# Patient Record
Sex: MALE | Race: WHITE | NOT HISPANIC OR LATINO | Employment: FULL TIME | URBAN - METROPOLITAN AREA
[De-identification: names, ages, dates, MRNs, and addresses within clinical notes are randomized per-mention and may not be internally consistent; named-entity substitution may affect disease eponyms.]

---

## 2020-03-10 NOTE — PROGRESS NOTES
[facility]   [address] [place] [address], [address]  [place], [place], [id number] [phone]  WWW.[facility]           OUTPATIENT CLINIC CONSULTATION NOTE    Patient care team:  Patient Care Team:  Brad Fenton DO as PCP - General (Family Medicine)    Requesting Provider and Reason for consultation: The patient is being seen today at the request of Brad Fenton DO for diastolic dysfunction.     Subjective:   Chief complaint:   Chief Complaint   Patient presents with   • diastolic disfunction       HPI:    En Saeed is a 65 y.o. male.  Daughter works as a pharmacist here at [employer]    Cardiac problem list:  1. Heart murmur, aortic sclerosis, mild aortic regurgitation  2. Hypertension  3. Hyperlipidemia  4. Hypothyroidism  5. GERD    He presents today for consultation.    The patient states that he has had mild nonradiating, upper epigastric discomfort while on a treadmill but does not feel this all the time.  When he walks 30 minutes a day or goes on hikes sometimes he feels just fine.  He also has reflux which occurs at different times than the epigastric discomfort described above.  Has tried various antacids and is currently on Protonix.  Still sometimes feels discomfort after heavy meals or when laying down.  This particular discomfort is not experienced when he exercises.  Denies associated dyspnea, palpitations.  Has dependent ankle edema at the end of the day sometimes that improves at night.  Non-smoker, no family history of premature coronary artery disease      Review of Systems:  Positive for epigastric discomfort, dependent edema  Negative for dyspnea with exertion, orthopnea, PND, palpitations, lightheadedness, syncope.   All other systems reviewed and are negative.    PFSH:  Patient Active Problem List   Diagnosis   • Essential hypertension   • Mixed hyperlipidemia   • GERD (gastroesophageal reflux disease)   • Hypothyroidism  (acquired)   • Aortic valve sclerosis   • Non-rheumatic aortic regurgitation         Current Outpatient Medications:   •  amLODIPine (NORVASC) 10 MG tablet, Take 10 mg by mouth Daily., Disp: , Rfl:   •  aspirin 81 MG EC tablet, Take 81 mg by mouth Daily., Disp: , Rfl:   •  azelastine (OPTIVAR) 0.05 % ophthalmic solution, 1 drop 2 (Two) Times a Day., Disp: , Rfl:   •  brimonidine-timolol (COMBIGAN) 0.2-0.5 % ophthalmic solution, Every 12 (Twelve) Hours., Disp: , Rfl:   •  coenzyme Q10 100 MG capsule, Take 100 mg by mouth Daily., Disp: , Rfl:   •  Krill Oil 1000 MG capsule, Take  by mouth., Disp: , Rfl:   •  levothyroxine (SYNTHROID, LEVOTHROID) 125 MCG tablet, Take 125 mcg by mouth Daily., Disp: , Rfl:   •  lisinopril (PRINIVIL,ZESTRIL) 40 MG tablet, Take 40 mg by mouth Daily., Disp: , Rfl:   •  loteprednol etabonate (LOTEMAX) 0.5 % gel ophthalmic gel, Apply  to eye(s) as directed by provider 4 (Four) Times a Day., Disp: , Rfl:   •  Multiple Vitamin (MULTI-VITAMIN DAILY PO), Take  by mouth Daily., Disp: , Rfl:   •  pantoprazole (PROTONIX) 40 MG EC tablet, Take 40 mg by mouth Daily., Disp: , Rfl:   •  rosuvastatin (CRESTOR) 10 MG tablet, Take 10 mg by mouth Daily., Disp: , Rfl:     Allergies   Allergen Reactions   • Ciprofloxacin Anaphylaxis   • Sulfa Antibiotics GI Intolerance       Social History     Socioeconomic History   • Marital status:      Spouse name: Not on file   • Number of children: Not on file   • Years of education: Not on file   • Highest education level: Not on file   Tobacco Use   • Smoking status: Former Smoker     Types: Cigarettes     Last attempt to quit: 3/13/1980     Years since quittin.0   • Smokeless tobacco: Never Used   Substance and Sexual Activity   • Alcohol use: Yes     Comment: occas   • Drug use: Never   • Sexual activity: Defer     Family History   Problem Relation Age of Onset   • Stroke Other    • Hypertension Other    • Hyperlipidemia Other    • Hypertension Mother     "  • Stroke Father    • Leukemia Sister    • No Known Problems Brother    • No Known Problems Brother          Objective:   Physical Exam:  /76 (BP Location: Right arm, Patient Position: Sitting)   Pulse 68   Ht 180.3 cm (71\")   Wt 92.1 kg (203 lb)   SpO2 98%   BMI 28.31 kg/m²   CONSTITUTIONAL: Well-nourished. In no acute distress.   SKIN: Warm and dry. No rashes noted  HEENT: Head is normocephalic and atraumatic. Pupils are equal and reactive to light bilaterally. Mucous membranes are pink and moist.   NECK: Supple without masses or thyromegaly. There is no jugular venous distention at 30°.  LUNGS: Normal effort. Clear to auscultation bilaterally without wheezing, rhonchi, or rales noted.   CARDIOVASCULAR: Regular rate with a normal S1 and S2.  Soft 2 out of 6 systolic ejection murmur at the right upper sternal border with no radiation.  Preserved S2.  There is no  gallop, rub, or click appreciated. Carotid upstrokes are 2+ and symmetrical without bruits. There is no peripheral edema.   ABDOMEN: Normal bowel sounds.  Soft and nondistended. No tenderness with palpitation.   MUSCULOSKELETAL:  No digital cyanosis  NEUROLOGICAL: Nonfocal.  PSYCHIATRIC: Alert, orientated x 3, appropriate affect         Labs:  No results found for: BUN, CREATININE, K, ALT, AST  No results found for: WBC, HGB, HCT, PLT    No results found for: CHOL  No results found for: TRIG  No results found for: HDL  No results found for: LDL  No components found for: LDLDIRECTC    Outside labs 1/21/2020, checked before increasing statin therapy to Crestor 10 mg nightly  Total cholesterol 252  HDL 50    Triglycerides 179    Diagnostic Data:      ECG 12 Lead  Date/Time: 3/13/2020 10:29 AM  Performed by: Garfield Johnson MD  Authorized by: Garfield Johnson MD   Comparison: not compared with previous ECG   Rhythm: sinus rhythm  Other findings: left ventricular hypertrophy  Comments: Heart rate 68, QRS 92, QTc 418            Left heart " catheterization 9/2000: Reportedly normal coronary anatomy    Transthoracic echocardiogram [facility] 1/30/2020  -Normal left ventricular wall thickness.  -Visually estimated EF 55% ±5%.  -Normal left ventricular systolic function with normal systolic strain pattern.  -Indeterminate diastolic dysfunction.  -Left atrial dimension 3.85cm, left atrial volume index of 37 ml/m2  -Sclerotic aortic valve leaflets, Mild aortic regurgitation  -Mild tricuspid regurgitation  -Mild pulmonic regurgitation    Assessment and Plan:   En was seen today for diastolic disfunction.    Diagnoses and all orders for this visit:    Anginal equivalent (CMS/HCC)  Precordial chest pain  Essential hypertension  Mixed hyperlipidemia  -The patient's discomforts are atypical for angina but he does sometimes note it more prominently while on the treadmill.  Has risk factors for CAD including hypertension, dyslipidemia, age  -Recommend exercise stress echocardiogram to rule out evidence of ischemia  -Discussed the importance of lifelong lifestyle and risk factor modification including maintaining a controlled blood pressure and continue to work on cholesterol reduction.  I agree with the recent increase in his rosuvastatin to 10 mg nightly.  May warrant even a higher dose in the future  -Continue aspirin for now    Aortic valve sclerosis  Non-rheumatic aortic regurgitation  -The patient has a murmur in the right upper sternal border position consistent with his aortic valve sclerosis  -Has mild aortic regurgitation likely related to the aortic valve changes  -Recommend lifestyle risk factor modifications as noted above in effort to prevent further progression.  Findings are very mild at the current time    Question of whether or not the patient has diastolic dysfunction  -The patient's echocardiogram does not show evidence of diastolic dysfunction  -We will obtain a copy of the patient's echocardiogram CD from [facility] to review the diastolic  parameters further and call the patient with any additional pertinent findings    - Return in about 1 year (around 3/13/2021).     Garfield Johnson MD, MSc, [facility]  Interventional Cardiology  [facility] at [facility]

## 2020-03-13 ENCOUNTER — CONSULT (OUTPATIENT)
Dept: CARDIOLOGY | Facility: CLINIC | Age: 65
End: 2020-03-13

## 2020-03-13 ENCOUNTER — TELEPHONE (OUTPATIENT)
Dept: CARDIOLOGY | Facility: CLINIC | Age: 65
End: 2020-03-13

## 2020-03-13 VITALS
HEART RATE: 68 BPM | OXYGEN SATURATION: 98 % | DIASTOLIC BLOOD PRESSURE: 76 MMHG | BODY MASS INDEX: 28.42 KG/M2 | SYSTOLIC BLOOD PRESSURE: 120 MMHG | HEIGHT: 71 IN | WEIGHT: 203 LBS

## 2020-03-13 DIAGNOSIS — E78.2 MIXED HYPERLIPIDEMIA: ICD-10-CM

## 2020-03-13 DIAGNOSIS — I20.8 ANGINAL EQUIVALENT (HCC): Primary | ICD-10-CM

## 2020-03-13 DIAGNOSIS — R07.2 PRECORDIAL CHEST PAIN: ICD-10-CM

## 2020-03-13 DIAGNOSIS — I35.1 NON-RHEUMATIC AORTIC REGURGITATION: ICD-10-CM

## 2020-03-13 DIAGNOSIS — I35.8 AORTIC VALVE SCLEROSIS: ICD-10-CM

## 2020-03-13 DIAGNOSIS — I10 ESSENTIAL HYPERTENSION: ICD-10-CM

## 2020-03-13 PROBLEM — K21.9 GERD (GASTROESOPHAGEAL REFLUX DISEASE): Status: ACTIVE | Noted: 2020-03-13

## 2020-03-13 PROBLEM — E03.9 HYPOTHYROIDISM (ACQUIRED): Status: ACTIVE | Noted: 2020-03-13

## 2020-03-13 PROCEDURE — 93000 ELECTROCARDIOGRAM COMPLETE: CPT | Performed by: INTERNAL MEDICINE

## 2020-03-13 PROCEDURE — 99204 OFFICE O/P NEW MOD 45 MIN: CPT | Performed by: INTERNAL MEDICINE

## 2020-03-13 RX ORDER — LEVOTHYROXINE SODIUM 0.12 MG/1
125 TABLET ORAL DAILY
COMMUNITY

## 2020-03-13 RX ORDER — LOTEPREDNOL ETABONATE 5 MG/G
GEL OPHTHALMIC 4 TIMES DAILY
COMMUNITY

## 2020-03-13 RX ORDER — AMLODIPINE BESYLATE 10 MG/1
10 TABLET ORAL DAILY
COMMUNITY

## 2020-03-13 RX ORDER — BRIMONIDINE TARTRATE AND TIMOLOL MALEATE 2; 5 MG/ML; MG/ML
SOLUTION OPHTHALMIC EVERY 12 HOURS
COMMUNITY

## 2020-03-13 RX ORDER — UBIDECARENONE 100 MG
100 CAPSULE ORAL DAILY
COMMUNITY

## 2020-03-13 RX ORDER — AZELASTINE HYDROCHLORIDE 0.5 MG/ML
1 SOLUTION/ DROPS OPHTHALMIC 2 TIMES DAILY
COMMUNITY

## 2020-03-13 RX ORDER — LISINOPRIL 40 MG/1
40 TABLET ORAL DAILY
COMMUNITY

## 2020-03-13 RX ORDER — BIOTIN 5 MG
TABLET ORAL
COMMUNITY

## 2020-03-13 RX ORDER — ROSUVASTATIN CALCIUM 10 MG/1
10 TABLET, COATED ORAL DAILY
COMMUNITY

## 2020-03-13 RX ORDER — PANTOPRAZOLE SODIUM 40 MG/1
40 TABLET, DELAYED RELEASE ORAL DAILY
COMMUNITY

## 2020-03-13 RX ORDER — ASPIRIN 81 MG/1
81 TABLET ORAL DAILY
COMMUNITY

## 2020-03-27 ENCOUNTER — HOSPITAL ENCOUNTER (OUTPATIENT)
Dept: CARDIOLOGY | Facility: HOSPITAL | Age: 65
Discharge: HOME | End: 2020-03-27

## 2020-03-27 DIAGNOSIS — Z00.6 EXAMINATION FOR NORMAL COMPARISON OR CONTROL IN CLINICAL RESEARCH: ICD-10-CM

## 2020-04-02 ENCOUNTER — APPOINTMENT (OUTPATIENT)
Dept: CARDIOLOGY | Facility: HOSPITAL | Age: 65
End: 2020-04-02

## 2020-05-08 ENCOUNTER — HOSPITAL ENCOUNTER (OUTPATIENT)
Dept: CARDIOLOGY | Facility: HOSPITAL | Age: 65
Discharge: HOME | End: 2020-05-08
Admitting: INTERNAL MEDICINE

## 2020-05-08 VITALS
HEART RATE: 82 BPM | RESPIRATION RATE: 18 BRPM | HEIGHT: 71 IN | DIASTOLIC BLOOD PRESSURE: 76 MMHG | BODY MASS INDEX: 28.42 KG/M2 | SYSTOLIC BLOOD PRESSURE: 132 MMHG | WEIGHT: 203 LBS | OXYGEN SATURATION: 97 %

## 2020-05-08 DIAGNOSIS — I20.8 ANGINAL EQUIVALENT (HCC): ICD-10-CM

## 2020-05-08 LAB
BH CV STRESS BP STAGE 1: NORMAL
BH CV STRESS BP STAGE 2: NORMAL
BH CV STRESS BP STAGE 3: NORMAL
BH CV STRESS DURATION MIN STAGE 1: 3
BH CV STRESS DURATION MIN STAGE 2: 3
BH CV STRESS DURATION MIN STAGE 3: 3
BH CV STRESS DURATION SEC STAGE 4: 42
BH CV STRESS ECHO POST STRESS EJECTION FRACTION EF: 65 %
BH CV STRESS GRADE STAGE 1: 10
BH CV STRESS GRADE STAGE 2: 12
BH CV STRESS GRADE STAGE 3: 14
BH CV STRESS GRADE STAGE 4: 16
BH CV STRESS HR STAGE 1: 98
BH CV STRESS HR STAGE 2: 113
BH CV STRESS HR STAGE 3: 126
BH CV STRESS HR STAGE 4: 137
BH CV STRESS METS STAGE 1: 5
BH CV STRESS METS STAGE 2: 7.5
BH CV STRESS METS STAGE 3: 10
BH CV STRESS METS STAGE 4: 13.5
BH CV STRESS O2 STAGE 1: 97
BH CV STRESS O2 STAGE 2: 97
BH CV STRESS O2 STAGE 3: 97
BH CV STRESS O2 STAGE 4: 96
BH CV STRESS PROTOCOL 1: NORMAL
BH CV STRESS RECOVERY BP: NORMAL MMHG
BH CV STRESS RECOVERY HR: 96 BPM
BH CV STRESS SPEED STAGE 1: 1.7
BH CV STRESS SPEED STAGE 2: 2.5
BH CV STRESS SPEED STAGE 3: 3.4
BH CV STRESS SPEED STAGE 4: 4.2
BH CV STRESS STAGE 1: 1
BH CV STRESS STAGE 2: 2
BH CV STRESS STAGE 3: 3
BH CV STRESS STAGE 4: 4
BH CV VAS BP RIGHT ARM: NORMAL MMHG
MAXIMAL PREDICTED HEART RATE: 154 BPM
PERCENT MAX PREDICTED HR: 88.96 %
STRESS BASELINE BP: NORMAL MMHG
STRESS BASELINE HR: 83 BPM
STRESS O2 SAT REST: 97 %
STRESS PERCENT HR: 105 %
STRESS POST ESTIMATED WORKLOAD: 12.3 METS
STRESS POST EXERCISE DUR MIN: 9 MIN
STRESS POST EXERCISE DUR SEC: 42 SEC
STRESS POST O2 SAT PEAK: 96 %
STRESS POST PEAK BP: NORMAL MMHG
STRESS POST PEAK HR: 137 BPM
STRESS TARGET HR: 131 BPM

## 2020-05-08 PROCEDURE — 93350 STRESS TTE ONLY: CPT

## 2020-05-08 PROCEDURE — 93018 CV STRESS TEST I&R ONLY: CPT | Performed by: INTERNAL MEDICINE

## 2020-05-08 PROCEDURE — 93350 STRESS TTE ONLY: CPT | Performed by: INTERNAL MEDICINE

## 2020-05-08 PROCEDURE — 93017 CV STRESS TEST TRACING ONLY: CPT

## 2020-05-08 PROCEDURE — 25010000002 SULFUR HEXAFLUORIDE MICROSPH 60.7-25 MG RECONSTITUTED SUSPENSION: Performed by: INTERNAL MEDICINE

## 2020-05-08 RX ADMIN — SULFUR HEXAFLUORIDE 4 ML: KIT at 14:30

## 2020-05-12 ENCOUNTER — TELEPHONE (OUTPATIENT)
Dept: CARDIOLOGY | Facility: CLINIC | Age: 65
End: 2020-05-12

## 2020-05-12 NOTE — TELEPHONE ENCOUNTER
----- Message from Garfield Johnson MD sent at 5/11/2020  6:17 PM EDT -----  Please let the patient know his stress test was negative for evidence of heart artery blockages

## 2021-02-05 ENCOUNTER — APPOINTMENT (OUTPATIENT)
Dept: CT IMAGING | Facility: HOSPITAL | Age: 66
End: 2021-02-05

## 2021-02-05 ENCOUNTER — HOSPITAL ENCOUNTER (EMERGENCY)
Facility: HOSPITAL | Age: 66
Discharge: HOME | End: 2021-02-05
Attending: EMERGENCY MEDICINE | Admitting: EMERGENCY MEDICINE

## 2021-02-05 VITALS
TEMPERATURE: 98.7 F | HEART RATE: 87 BPM | DIASTOLIC BLOOD PRESSURE: 115 MMHG | RESPIRATION RATE: 18 BRPM | WEIGHT: 200 LBS | OXYGEN SATURATION: 97 % | BODY MASS INDEX: 28 KG/M2 | HEIGHT: 71 IN | SYSTOLIC BLOOD PRESSURE: 192 MMHG

## 2021-02-05 DIAGNOSIS — M54.12 CERVICAL RADICULOPATHY: Primary | ICD-10-CM

## 2021-02-05 LAB
ALBUMIN SERPL-MCNC: 4.4 G/DL (ref 3.5–5.2)
ALBUMIN/GLOB SERPL: 1.4 G/DL
ALP SERPL-CCNC: 76 U/L (ref 39–117)
ALT SERPL W P-5'-P-CCNC: 25 U/L (ref 1–41)
ANION GAP SERPL CALCULATED.3IONS-SCNC: 6 MMOL/L (ref 5–15)
AST SERPL-CCNC: 26 U/L (ref 1–40)
BASOPHILS # BLD AUTO: 0.03 10*3/MM3 (ref 0–0.2)
BASOPHILS NFR BLD AUTO: 0.4 % (ref 0–1.5)
BILIRUB SERPL-MCNC: 0.4 MG/DL (ref 0–1.2)
BUN SERPL-MCNC: 15 MG/DL (ref 8–23)
BUN/CREAT SERPL: 16.9 (ref 7–25)
CALCIUM SPEC-SCNC: 9.1 MG/DL (ref 8.6–10.5)
CHLORIDE SERPL-SCNC: 103 MMOL/L (ref 98–107)
CO2 SERPL-SCNC: 31 MMOL/L (ref 22–29)
CREAT SERPL-MCNC: 0.89 MG/DL (ref 0.76–1.27)
DEPRECATED RDW RBC AUTO: 42.1 FL (ref 37–54)
EOSINOPHIL # BLD AUTO: 0.07 10*3/MM3 (ref 0–0.4)
EOSINOPHIL NFR BLD AUTO: 1 % (ref 0.3–6.2)
ERYTHROCYTE [DISTWIDTH] IN BLOOD BY AUTOMATED COUNT: 12.4 % (ref 12.3–15.4)
GFR SERPL CREATININE-BSD FRML MDRD: 85 ML/MIN/1.73
GLOBULIN UR ELPH-MCNC: 3.1 GM/DL
GLUCOSE SERPL-MCNC: 76 MG/DL (ref 65–99)
HCT VFR BLD AUTO: 46.1 % (ref 37.5–51)
HGB BLD-MCNC: 15.4 G/DL (ref 13–17.7)
IMM GRANULOCYTES # BLD AUTO: 0.03 10*3/MM3 (ref 0–0.05)
IMM GRANULOCYTES NFR BLD AUTO: 0.4 % (ref 0–0.5)
LYMPHOCYTES # BLD AUTO: 2.22 10*3/MM3 (ref 0.7–3.1)
LYMPHOCYTES NFR BLD AUTO: 33 % (ref 19.6–45.3)
MCH RBC QN AUTO: 31 PG (ref 26.6–33)
MCHC RBC AUTO-ENTMCNC: 33.4 G/DL (ref 31.5–35.7)
MCV RBC AUTO: 92.8 FL (ref 79–97)
MONOCYTES # BLD AUTO: 0.52 10*3/MM3 (ref 0.1–0.9)
MONOCYTES NFR BLD AUTO: 7.7 % (ref 5–12)
NEUTROPHILS NFR BLD AUTO: 3.86 10*3/MM3 (ref 1.7–7)
NEUTROPHILS NFR BLD AUTO: 57.5 % (ref 42.7–76)
NRBC BLD AUTO-RTO: 0 /100 WBC (ref 0–0.2)
PLATELET # BLD AUTO: 224 10*3/MM3 (ref 140–450)
PMV BLD AUTO: 11 FL (ref 6–12)
POTASSIUM SERPL-SCNC: 4.5 MMOL/L (ref 3.5–5.2)
PROT SERPL-MCNC: 7.5 G/DL (ref 6–8.5)
RBC # BLD AUTO: 4.97 10*6/MM3 (ref 4.14–5.8)
SODIUM SERPL-SCNC: 140 MMOL/L (ref 136–145)
TROPONIN T SERPL-MCNC: <0.01 NG/ML (ref 0–0.03)
WBC # BLD AUTO: 6.73 10*3/MM3 (ref 3.4–10.8)

## 2021-02-05 PROCEDURE — 84484 ASSAY OF TROPONIN QUANT: CPT

## 2021-02-05 PROCEDURE — 99283 EMERGENCY DEPT VISIT LOW MDM: CPT

## 2021-02-05 PROCEDURE — 85025 COMPLETE CBC W/AUTO DIFF WBC: CPT

## 2021-02-05 PROCEDURE — 80053 COMPREHEN METABOLIC PANEL: CPT

## 2021-02-05 PROCEDURE — 72125 CT NECK SPINE W/O DYE: CPT

## 2021-02-05 PROCEDURE — 93005 ELECTROCARDIOGRAM TRACING: CPT

## 2021-02-05 PROCEDURE — 93005 ELECTROCARDIOGRAM TRACING: CPT | Performed by: EMERGENCY MEDICINE

## 2021-02-05 RX ORDER — HYDROCODONE BITARTRATE AND ACETAMINOPHEN 5; 325 MG/1; MG/1
1 TABLET ORAL EVERY 6 HOURS PRN
Qty: 12 TABLET | Refills: 0 | Status: SHIPPED | OUTPATIENT
Start: 2021-02-05

## 2021-02-05 RX ORDER — PREDNISONE 20 MG/1
20 TABLET ORAL 2 TIMES DAILY
Qty: 10 TABLET | Refills: 0 | Status: SHIPPED | OUTPATIENT
Start: 2021-02-05 | End: 2021-02-10

## 2021-02-05 RX ORDER — CYCLOBENZAPRINE HCL 10 MG
10 TABLET ORAL 3 TIMES DAILY PRN
Qty: 20 TABLET | Refills: 0 | Status: SHIPPED | OUTPATIENT
Start: 2021-02-05

## 2021-02-05 NOTE — ED PROVIDER NOTES
" EMERGENCY DEPARTMENT ENCOUNTER    Room Number:  ETHAN/ETHAN  Date of encounter:  2/5/2021  PCP: Brad Fenton DO  Historian: Patient      HPI:  Chief Complaint: Left arm pain      Context: En Saeed is a 66 y.o. male who presents to the ED c/o left arm pain which began approximately 4 days ago.  The patient states that he feels like his pain is \"related to nerve pain\" however when it failed to subside he decided to come to the emergency department for further evaluation.  The patient states he has had no recent injury to his arm/back and has not done anything out of the ordinary to cause his pain.  He reports that his pain is worse with movement, specifically with external rotation and adduction, and describes this as a sharp/stabbing pain which is constant.  The pain radiates from his right trapezius muscle down to his hand, with the worst pain being near his olcraneon.  He denies any weakness, numbness, neck pain, back pain, or chest pain associated with this arm pain.  He states that he has taken meloxicam without improvement of his pain but has not tried any anti-inflammatory medications.  He is scheduled to see his primary care physician early next week.    Past medical history includes hypertension, hyperlipidemia, GERD, hypothyroidism and low back pain.    Surgical history includes cardiac catheterization and hernia repair.    Social history includes former smoker.  He endorses social alcohol use and denies use of illicit drugs.    Family medical history is significant for mother with hypertension, coronary artery disease, and hyperlipidemia and father with stroke.  He resides in [facility] with his wife.    PAST MEDICAL HISTORY  Active Ambulatory Problems     Diagnosis Date Noted   • Essential hypertension 03/13/2020   • Mixed hyperlipidemia 03/13/2020   • GERD (gastroesophageal reflux disease) 03/13/2020   • Hypothyroidism (acquired) 03/13/2020   • Aortic valve sclerosis 03/13/2020   • " Non-rheumatic aortic regurgitation 2020     Resolved Ambulatory Problems     Diagnosis Date Noted   • No Resolved Ambulatory Problems     Past Medical History:   Diagnosis Date   • Heart murmur    • Hyperlipidemia    • Hypertension    • Thyroid disease        PAST SURGICAL HISTORY  Past Surgical History:   Procedure Laterality Date   • CARDIAC CATHETERIZATION     • HERNIA REPAIR         FAMILY HISTORY  Family History   Problem Relation Age of Onset   • Stroke Other    • Hypertension Other    • Hyperlipidemia Other    • Hypertension Mother    • Stroke Father    • Leukemia Sister    • No Known Problems Brother    • No Known Problems Brother      SOCIAL HISTORY  Social History     Socioeconomic History   • Marital status:      Spouse name: Not on file   • Number of children: Not on file   • Years of education: Not on file   • Highest education level: Not on file   Tobacco Use   • Smoking status: Former Smoker     Types: Cigarettes     Quit date: 3/13/1980     Years since quittin.9   • Smokeless tobacco: Never Used   Substance and Sexual Activity   • Alcohol use: Yes     Comment: occas   • Drug use: Never   • Sexual activity: Defer       ALLERGIES  Ciprofloxacin and Sulfa antibiotics    REVIEW OF SYSTEMS  Review of Systems   Constitutional: Negative for activity change, chills, fatigue and fever.   HENT: Negative for congestion, rhinorrhea and sore throat.    Respiratory: Negative for cough, chest tightness and shortness of breath.    Cardiovascular: Negative for chest pain and leg swelling.   Gastrointestinal: Negative for abdominal pain, constipation, diarrhea, nausea and vomiting.   Genitourinary: Negative for difficulty urinating and dysuria.   Musculoskeletal: Positive for arthralgias, back pain, myalgias and neck stiffness. Negative for neck pain.   Neurological: Negative for dizziness, syncope, weakness, light-headedness, numbness and headaches.   All other systems reviewed and are negative.        All systems reviewed and negative except for those discussed in HPI.       PHYSICAL EXAM    I have reviewed the triage vital signs and nursing notes.    ED Triage Vitals [02/05/21 1110]   Temp Heart Rate Resp BP SpO2   98.7 °F (37.1 °C) 87 18 (!) 192/115 97 %      Temp src Heart Rate Source Patient Position BP Location FiO2 (%)   Infrared Monitor Sitting Right arm --       Physical Exam  GENERAL:   Appears alert and oriented.  He is nontoxic in appearance and in no apparent distress.  Blood pressure is slightly elevated, 162/95, but otherwise he is hemodynamically stable.  HENT: Nares patent.  EYES: No scleral icterus.  CV: Regular rhythm, regular rate.  No murmur, rub, or gallop.  RESPIRATORY: Normal effort.  No audible wheezes, rales or rhonchi.  Lung sounds are clear to auscultation bilaterally.  No tachypnea, use of  muscles, or respiratory distress noted.  ABDOMEN: Soft, nontender.  No active bowel sounds.  MUSCULOSKELETAL: No deformities.  Full range of motion in all extremities.  No pain upon palpitation.  Mild pain with external rotation.  NEURO: Alert, moves all extremities, follows commands.  SKIN: Warm, dry, no rash visualized.    LAB RESULTS  Recent Results (from the past 24 hour(s))   CBC Auto Differential    Collection Time: 02/05/21 12:32 PM    Specimen: Blood   Result Value Ref Range    WBC 6.73 3.40 - 10.80 10*3/mm3    RBC 4.97 4.14 - 5.80 10*6/mm3    Hemoglobin 15.4 13.0 - 17.7 g/dL    Hematocrit 46.1 37.5 - 51.0 %    MCV 92.8 79.0 - 97.0 fL    MCH 31.0 26.6 - 33.0 pg    MCHC 33.4 31.5 - 35.7 g/dL    RDW 12.4 12.3 - 15.4 %    RDW-SD 42.1 37.0 - 54.0 fl    MPV 11.0 6.0 - 12.0 fL    Platelets 224 140 - 450 10*3/mm3    Neutrophil % 57.5 42.7 - 76.0 %    Lymphocyte % 33.0 19.6 - 45.3 %    Monocyte % 7.7 5.0 - 12.0 %    Eosinophil % 1.0 0.3 - 6.2 %    Basophil % 0.4 0.0 - 1.5 %    Immature Grans % 0.4 0.0 - 0.5 %    Neutrophils, Absolute 3.86 1.70 - 7.00 10*3/mm3    Lymphocytes, Absolute  2.22 0.70 - 3.10 10*3/mm3    Monocytes, Absolute 0.52 0.10 - 0.90 10*3/mm3    Eosinophils, Absolute 0.07 0.00 - 0.40 10*3/mm3    Basophils, Absolute 0.03 0.00 - 0.20 10*3/mm3    Immature Grans, Absolute 0.03 0.00 - 0.05 10*3/mm3    nRBC 0.0 0.0 - 0.2 /100 WBC   Comprehensive Metabolic Panel    Collection Time: 02/05/21 12:32 PM    Specimen: Blood   Result Value Ref Range    Glucose 76 65 - 99 mg/dL    BUN 15 8 - 23 mg/dL    Creatinine 0.89 0.76 - 1.27 mg/dL    Sodium 140 136 - 145 mmol/L    Potassium 4.5 3.5 - 5.2 mmol/L    Chloride 103 98 - 107 mmol/L    CO2 31.0 (H) 22.0 - 29.0 mmol/L    Calcium 9.1 8.6 - 10.5 mg/dL    Total Protein 7.5 6.0 - 8.5 g/dL    Albumin 4.40 3.50 - 5.20 g/dL    ALT (SGPT) 25 1 - 41 U/L    AST (SGOT) 26 1 - 40 U/L    Alkaline Phosphatase 76 39 - 117 U/L    Total Bilirubin 0.4 0.0 - 1.2 mg/dL    eGFR Non African Amer 85 >60 mL/min/1.73    Globulin 3.1 gm/dL    A/G Ratio 1.4 g/dL    BUN/Creatinine Ratio 16.9 7.0 - 25.0    Anion Gap 6.0 5.0 - 15.0 mmol/L   Troponin    Collection Time: 02/05/21 12:32 PM    Specimen: Blood   Result Value Ref Range    Troponin T <0.010 0.000 - 0.030 ng/mL       If labs were ordered, I independently reviewed the results.        RADIOLOGY  Ct Cervical Spine Without Contrast    Result Date: 2/5/2021  EXAMINATION: CT CERVICAL SPINE WO CONTRAST-02/05/2021:  INDICATION: Neck pain radiates, neck pain, trauma.  TECHNIQUE: Multiple axial CT imaging was obtained of the cervical spine without the administration of intravenous contrast.  The radiation dose reduction device was turned on for each scan per the ALARA (As Low as Reasonably Achievable) protocol.  COMPARISON: NONE.  FINDINGS: The vertebral body height and disc spaces are preserved. Facets are well aligned. Pedicles are intact. The odontoid is intact. Lateral masses are well aligned. Some degenerative changes seen within the posterior facets. Lung apices are clear. Minimal degenerative changes identified at the  C4/C5, C5/C6 and C6/C7 levels. There is no prevertebral soft tissue swelling. No fracture or dislocation.      Multilevel degenerative changes seen throughout the cervical spine with no evidence of fracture or dislocation.  D:  02/05/2021 E:  02/05/2021         PROCEDURES    Procedures    ECG 12 Lead             MEDICATIONS GIVEN IN ER    Medications - No data to display      PROGRESS, DATA ANALYSIS, CONSULTS, AND MEDICAL DECISION MAKING    All labs have been independently reviewed by me.  All radiology studies have been reviewed by me and the radiologist dictating the report.   EKG's have been independently viewed and interpreted by me.           Symptoms and findings are consistent with cervical radiculopathy.  Will discharge to home with pain medication muscle relaxants and steroid.  Refer to neurosurgery for further evaluation.  He was encouraged to return if he has any change or worsening of symptoms.  He verbalizes understanding and is agreeable to plan.      AS OF 21:16 EST VITALS:    BP - (!) 192/115  HR - 87  TEMP - 98.7 °F (37.1 °C) (Infrared)  O2 SATS - 97%        DIAGNOSIS  Final diagnoses:   Cervical radiculopathy         DISPOSITION  DISCHARGE    Patient discharged in stable condition.    Reviewed implications of results, diagnosis, meds, responsibility to follow up, warning signs and symptoms of possible worsening, potential complications and reasons to return to ER.    Patient/Family voiced understanding of above instructions.    Discussed plan for discharge, as there is no emergent indication for admission.  Pt/family is agreeable and understands need for follow up and possible repeat testing.  Pt/family is aware that discharge does not mean that nothing is wrong but that it indicates no emergency is currently present that requires admission and they must continue care with follow-up as given below or with a physician of their choice.     FOLLOW-UP  Tan Macedo MD  [facility]  [address] [place] [address]  [phone]    Schedule an appointment as soon as possible for a visit            Medication List      New Prescriptions    cyclobenzaprine 10 MG tablet  Commonly known as: FLEXERIL  Take 1 tablet by mouth 3 (Three) Times a Day As Needed for Muscle Spasms.     HYDROcodone-acetaminophen 5-325 MG per tablet  Commonly known as: NORCO  Take 1 tablet by mouth Every 6 (Six) Hours As Needed for Severe Pain .     predniSONE 20 MG tablet  Commonly known as: DELTASONE  Take 1 tablet by mouth 2 (Two) Times a Day for 5 days.           Where to Get Your Medications      These medications were sent to [facility] - [address] [facility] AT [facility] - [phone]  - [phone]   [address] [facility] [address]    Phone: [phone]   · cyclobenzaprine 10 MG tablet  · HYDROcodone-acetaminophen 5-325 MG per tablet  · predniSONE 20 MG tablet                  Wolf Pillai PA-C  02/05/21 0501

## 2021-02-08 LAB
QT INTERVAL: 378 MS
QTC INTERVAL: 446 MS

## 2022-01-13 DIAGNOSIS — Z12.11 SCREENING FOR COLON CANCER: Primary | ICD-10-CM

## 2022-02-07 ENCOUNTER — TELEPHONE (OUTPATIENT)
Dept: GASTROENTEROLOGY | Facility: CLINIC | Age: 67
End: 2022-02-07

## 2022-02-07 NOTE — TELEPHONE ENCOUNTER
,  Mr. Saeed called he was Dx with a zenker diverticulum in 2020. The doctor that he was referred too at  has left. Patient would like to know what doctor you would recommend for this surgery. He says he would like your recommendation. Please advise?

## 2022-02-15 ENCOUNTER — OUTSIDE FACILITY SERVICE (OUTPATIENT)
Dept: GASTROENTEROLOGY | Facility: CLINIC | Age: 67
End: 2022-02-15

## 2022-02-15 PROCEDURE — G0105 COLORECTAL SCRN; HI RISK IND: HCPCS | Performed by: INTERNAL MEDICINE

## 2022-02-15 PROCEDURE — G0500 MOD SEDAT ENDO SERVICE >5YRS: HCPCS | Performed by: INTERNAL MEDICINE

## 2024-03-18 ENCOUNTER — OFFICE VISIT (OUTPATIENT)
Dept: UROLOGY | Facility: CLINIC | Age: 69
End: 2024-03-18
Payer: MEDICARE

## 2024-03-18 VITALS — BODY MASS INDEX: 28.7 KG/M2 | HEART RATE: 68 BPM | OXYGEN SATURATION: 98 % | HEIGHT: 71 IN | WEIGHT: 205 LBS

## 2024-03-18 DIAGNOSIS — R39.9 LOWER URINARY TRACT SYMPTOMS (LUTS): Primary | ICD-10-CM

## 2024-03-18 RX ORDER — IRBESARTAN 300 MG/1
TABLET ORAL
COMMUNITY
Start: 2024-03-17

## 2024-03-18 RX ORDER — CYCLOSPORINE 0.5 MG/ML
1 EMULSION OPHTHALMIC
COMMUNITY

## 2024-03-18 RX ORDER — ACYCLOVIR 400 MG/1
400 TABLET ORAL
COMMUNITY

## 2024-03-18 NOTE — PROGRESS NOTES
LUTS Male Office Visit      Patient Name: En Saeed  : 1955   MRN: [id number]     Chief Complaint: Lower Urinary Tract Symptoms    Referring Provider: Brad Fenton DO    History of Present Illness: Mr. Saeed is a 69 y.o. male with history of lower urinary tract symptoms.  Past medical history includes hypertension, hyperlipidemia, hypothyroidism, GERD.  He presents today for evaluation of lower urinary tract symptoms.  He reports primary bother symptoms include nocturia, irritative based urinary symptoms.  He does report mild obstructive urinary symptoms.  He has trialed alpha-blocker therapy-tamsulosin without improvement.  He denies significant irritative fluid intake.      Denies hematuria.  Denies history of urinary tract infection.    He denies past urologic evaluation including instrumentation or procedure.    IPSS 18    Previous treatments include: Alpha blocker therapy      Subjective      Review of System: Review of Systems   Genitourinary:  Negative for decreased urine volume, difficulty urinating, dysuria, enuresis, flank pain, frequency, hematuria and urgency.        I have reviewed the ROS documented by my clinical staff, updated appropriate and I agree. Andrea Cohn MD    Past Medical History:  Past Medical History:   Diagnosis Date    GERD (gastroesophageal reflux disease)     Heart murmur     Hyperlipidemia     Hypertension     Thyroid disease        Past Surgical History:  Past Surgical History:   Procedure Laterality Date    CARDIAC CATHETERIZATION      HERNIA REPAIR         Medications:    Current Outpatient Medications:     acyclovir (ZOVIRAX) 400 MG tablet, Take 1 tablet by mouth., Disp: , Rfl:     amLODIPine (NORVASC) 10 MG tablet, Take 1 tablet by mouth Daily., Disp: , Rfl:     aspirin 81 MG EC tablet, Take 1 tablet by mouth Daily., Disp: , Rfl:     azelastine (OPTIVAR) 0.05 % ophthalmic solution, 1 drop 2 (Two) Times a Day., Disp: , Rfl:     brimonidine-timolol  (COMBIGAN) 0.2-0.5 % ophthalmic solution, Every 12 (Twelve) Hours., Disp: , Rfl:     coenzyme Q10 100 MG capsule, Take 1 capsule by mouth Daily., Disp: , Rfl:     cycloSPORINE (RESTASIS) 0.05 % ophthalmic emulsion, Apply 1 drop to eye(s) as directed by provider., Disp: , Rfl:     irbesartan (AVAPRO) 300 MG tablet, , Disp: , Rfl:     Krill Oil 1000 MG capsule, Take  by mouth., Disp: , Rfl:     levothyroxine (SYNTHROID, LEVOTHROID) 125 MCG tablet, Take 1 tablet by mouth Daily., Disp: , Rfl:     loteprednol etabonate (LOTEMAX) 0.5 % gel ophthalmic gel, Apply  to eye(s) as directed by provider 4 (Four) Times a Day., Disp: , Rfl:     Multiple Vitamin (MULTI-VITAMIN DAILY PO), Take  by mouth Daily., Disp: , Rfl:     pantoprazole (PROTONIX) 40 MG EC tablet, Take 1 tablet by mouth Daily., Disp: , Rfl:     rosuvastatin (CRESTOR) 10 MG tablet, Take 2 tablets by mouth Daily., Disp: , Rfl:     cyclobenzaprine (FLEXERIL) 10 MG tablet, Take 1 tablet by mouth 3 (Three) Times a Day As Needed for Muscle Spasms., Disp: 20 tablet, Rfl: 0    HYDROcodone-acetaminophen (NORCO) 5-325 MG per tablet, Take 1 tablet by mouth Every 6 (Six) Hours As Needed for Severe Pain ., Disp: 12 tablet, Rfl: 0    lisinopril (PRINIVIL,ZESTRIL) 40 MG tablet, Take 40 mg by mouth Daily., Disp: , Rfl:     Sod Picosulfate-Mag Ox-Cit Acd 10-3.5-12 MG-GM -GM/160ML solution, Take 160 mL by mouth Take As Directed for 2 doses., Disp: 320 mL, Rfl: 0    Allergies:  Allergies   Allergen Reactions    Ciprofloxacin Anaphylaxis    Sulfa Antibiotics GI Intolerance       Social History:  Social History     Socioeconomic History    Marital status:    Tobacco Use    Smoking status: Former     Current packs/day: 0.00     Types: Cigarettes     Quit date: 3/13/1980     Years since quittin.0     Passive exposure: Past    Smokeless tobacco: Never   Vaping Use    Vaping status: Never Used   Substance and Sexual Activity    Alcohol use: Yes     Comment: occas    Drug use:  "Never    Sexual activity: Defer       Family History:  Family History   Problem Relation Age of Onset    Stroke Other     Hypertension Other     Hyperlipidemia Other     Hypertension Mother     Stroke Father     Leukemia Sister     No Known Problems Brother     No Known Problems Brother        IPSS Questionnaire (AUA-7):  Over the past month…    1)  Incomplete Emptying  How often have you had a sensation of not emptying your bladder?  3 - About half the time   2)  Frequency  How often have you had to urinate less than every two hours? 3 - About half the time   3)  Intermittency  How often have you found you stopped and started again several times when you urinated?  2 - Less than half the time   4) Urgency  How often have you found it difficult to postpone urination?  2 - Less than half the time   5) Weak Stream  How often have you had a weak urinary stream?  2 - Less than half the time   6) Straining  How often have you had to push or strain to begin urination?  2 - Less than half the time   7) Nocturia  How many times did you typically get up at night to urinate?  4 - 4 times   Total Score:  18       Quality of life due to urinary symptoms:  If you were to spend the rest of your life with your urinary condition the way it is now, how would you feel about that? 3-Mixed   Urine Leakage (Incontinence) 0-No Leakage           Objective     Physical Exam:     Vital Signs:   Vitals:    03/18/24 1424   Pulse: 68   SpO2: 98%   Weight: 93 kg (205 lb)   Height: 180.3 cm (71\")   PainSc: 0-No pain     Body mass index is 28.59 kg/m².     Physical Exam  Vitals and nursing note reviewed.   Constitutional:       Appearance: Normal appearance.   HENT:      Head: Normocephalic and atraumatic.   Cardiovascular:      Comments: Well perfused  Pulmonary:      Effort: Pulmonary effort is normal.   Abdominal:      General: Abdomen is flat.      Palpations: Abdomen is soft.   Musculoskeletal:         General: Normal range of motion. " "  Skin:     General: Skin is warm and dry.   Neurological:      General: No focal deficit present.      Mental Status: He is alert and oriented to person, place, and time. Mental status is at baseline.   Psychiatric:         Mood and Affect: Mood normal.         Behavior: Behavior normal.         Thought Content: Thought content normal.         Judgment: Judgment normal.             Labs:   No results found for: \"PSA\"    Brief Urine Lab Results       None                 Lab Results   Component Value Date    GLUCOSE 76 02/05/2021    CALCIUM 9.1 02/05/2021     02/05/2021    K 4.5 02/05/2021    CO2 31.0 (H) 02/05/2021     02/05/2021    BUN 15 02/05/2021    CREATININE 0.89 02/05/2021    EGFRIFNONA 85 02/05/2021    BCR 16.9 02/05/2021    ANIONGAP 6.0 02/05/2021       Lab Results   Component Value Date    WBC 6.0 09/12/2022    HGB 15.0 09/12/2022    HCT 43.6 09/12/2022    MCV 90.9 09/12/2022     09/12/2022       Images:   No Images in the past 120 days found..    Measures:   Tobacco:   En Saeed  reports that he quit smoking about 44 years ago. His smoking use included cigarettes. He has been exposed to tobacco smoke. He has never used smokeless tobacco. I have educated him on the risk of diseases from using tobacco products.     Assessment / Plan      Assessment:  Mr. Saeed is a 69 y.o. male who presents with lower urinary tract symptoms. He reports primary bother with irritative symptoms, nocturia.  Does report mild obstructive symptoms. IPSS today is 18.  He reports that he would like to consider desire for further workup and evaluation.  He reports that he will contact office if he desires follow-up.    Diagnoses and all orders for this visit:    1. Lower urinary tract symptoms (LUTS) (Primary)           Patient Education:     Today we discussed the etiology and management of lower urinary tract symptoms.  We discussed work-up including history and physical exam, symptom questionnaire, PVR. " We discussed benign growth of the prostate as men age.  We discussed this occurs mostly in the transition zone of the prostate.  We discussed there is both an increase the in the amount of prostatic stroma as well as the number of alpha-1 receptors in the prostate.  We discussed that as the prostate grows there can be increased bladder outlet resistance.  We discussed this results in increased smooth muscle muscle tone which can cause long-term changes to the bladder.  We discussed the presentation and symptoms including decreased force of stream, hesitancy, intermittent stream, incomplete emptying, frequency, urgency, incontinence, nocturia. We discussed that his lower urinary tract symptoms are both storage and voiding symptoms.  We discussed that if the symptoms are prolonged the bladder may be decompensated resulting in absent or ineffective contractions.  We discussed that the severity of symptoms do not always correlate with the exact prostate size.  Discussed that the severity of urinary symptoms do not correlate with the degree of bladder outlet obstruction.  We discussed the indication for operative intervention includes hematuria, recurrent urinary tract infection, damage to the kidney and change in renal function, acute urinary retention.  We discussed that the patient does not have any of these for indications symptom bother may direct desire for operative intervention.    We discussed management strategies including conservative measures to improve symptoms.  We discussed the importance of decreasing caffeinated and irritative beverages, increasing fluid hydration.  We discussed the role that obstipation can play in urinary symptoms and the importance of a bowel regimen.  We discussed medical management and medical options to improve urinary symptoms.  We discussed alpha-blocker, risks and benefits of this medication.  We discussed 5 alpha reductase inhibitor, risks and benefits of this medication.      We discussed cystoscopy, transrectal ultrasound of the prostate for volume.  We discussed that cystoscopy will allow evaluation of the urethra, prostatic architecture/anatomy, health of the bladder.  We discussed transrectal ultrasound of the prostate for volume will allow us to measure the exact size of the prostate.  We discussed the importance of these work-ups in identifying and tailoring a specific treatment strategy to the patient.     We discussed that based upon the work-up described above we will further decide an appropriate treatment plan.  We discussed that these procedures will help us identify whether p.o. medical management versus procedure for bladder outlet obstruction is warranted.    He is understanding and agreeable with plan of care.      I spent approximately 45 minutes providing clinical care for this patient; including review of patient's chart and provider documentation, face to face time spent with patient in examination room (obtaining history, performing physical exam, discussing diagnosis and management options), placing orders, and completing patient documentation.     Andrea Cohn MD  [facility]

## 2024-03-21 PROBLEM — R39.9 LOWER URINARY TRACT SYMPTOMS (LUTS): Status: ACTIVE | Noted: 2024-03-21

## 2025-01-28 ENCOUNTER — TELEPHONE (OUTPATIENT)
Dept: GASTROENTEROLOGY | Facility: CLINIC | Age: 70
End: 2025-01-28

## 2025-01-28 NOTE — TELEPHONE ENCOUNTER
TRIED TO CALL PATIENT. UNABLE TO REACH [facility]. PATIENT ISNT DUE FOR ANOTHER COLON UNTIL 2027 HUB CAN RELAY .

## 2025-01-28 NOTE — TELEPHONE ENCOUNTER
Caller: Isadora Saeed    Relationship: Emergency Contact    Best call back number: [phone]    What is the best time to reach you: ANYTIME    Who are you requesting to speak with (clinical staff, provider,  specific staff member): CLINICAL STAFF / PROVIDER     What was the call regarding: PT SPOUSE CALLED WONDERING IF PT NEEDS TO SCHEDULE AND UPCOMING COLONOSCOPY. PLEASE ADVISE.

## 2025-01-28 NOTE — TELEPHONE ENCOUNTER
PT WIFE WAS WONDER IF THERE WAS ANYTHING FOUND IN THE LAST ONE. I DID LET HER KNOW HE IS NOT DUE UNTIL 2027 FOR ANOTHER ONE

## 2025-06-09 ENCOUNTER — OFFICE VISIT (OUTPATIENT)
Dept: CARDIOLOGY | Facility: CLINIC | Age: 70
End: 2025-06-09
Payer: MEDICARE

## 2025-06-09 VITALS
BODY MASS INDEX: 27.86 KG/M2 | HEART RATE: 67 BPM | DIASTOLIC BLOOD PRESSURE: 70 MMHG | WEIGHT: 199 LBS | OXYGEN SATURATION: 96 % | HEIGHT: 71 IN | SYSTOLIC BLOOD PRESSURE: 114 MMHG

## 2025-06-09 DIAGNOSIS — I35.8 AORTIC VALVE SCLEROSIS: ICD-10-CM

## 2025-06-09 DIAGNOSIS — E78.2 MIXED HYPERLIPIDEMIA: ICD-10-CM

## 2025-06-09 DIAGNOSIS — I10 ESSENTIAL HYPERTENSION: Primary | ICD-10-CM

## 2025-06-09 PROCEDURE — 99204 OFFICE O/P NEW MOD 45 MIN: CPT | Performed by: INTERNAL MEDICINE

## 2025-06-09 PROCEDURE — 93000 ELECTROCARDIOGRAM COMPLETE: CPT | Performed by: INTERNAL MEDICINE

## 2025-06-09 PROCEDURE — 3078F DIAST BP <80 MM HG: CPT | Performed by: INTERNAL MEDICINE

## 2025-06-09 PROCEDURE — 3074F SYST BP LT 130 MM HG: CPT | Performed by: INTERNAL MEDICINE

## 2025-06-09 RX ORDER — HYDROCHLOROTHIAZIDE 12.5 MG/1
12.5 CAPSULE ORAL DAILY
COMMUNITY
Start: 2023-05-08 | End: 2025-06-09 | Stop reason: SDUPTHER

## 2025-06-09 RX ORDER — AMLODIPINE BESYLATE 5 MG/1
5 TABLET ORAL DAILY
Qty: 30 TABLET | Refills: 5 | Status: SHIPPED | OUTPATIENT
Start: 2025-06-09

## 2025-06-09 RX ORDER — ROSUVASTATIN CALCIUM 10 MG/1
10 TABLET, COATED ORAL DAILY
Qty: 30 TABLET | Refills: 5 | Status: SHIPPED | OUTPATIENT
Start: 2025-06-09

## 2025-06-09 RX ORDER — HYDROCHLOROTHIAZIDE 12.5 MG/1
12.5 CAPSULE ORAL DAILY
Qty: 30 CAPSULE | Refills: 5 | Status: SHIPPED | OUTPATIENT
Start: 2025-06-09

## 2025-06-09 NOTE — PROGRESS NOTES
[facility]  [address], [address]  [place], [place], [id number] [phone]  [email or web address]           OUTPATIENT CLINIC CONSULTATION NOTE    Patient care team:  Patient Care Team:  Brad Fenton DO as PCP - General (Family Medicine)    Requesting Provider and Reason for consultation: The patient is being seen today at the request of Brad Fenton DO for aortic regurgitation.     Subjective:   Chief complaint:   Chief Complaint   Patient presents with    JES Saeed is a 70 y.o. male.  Cardiac focused problem list:  Aortic regurgitation  STJ echo 1/2020: EF 55%, LA VI 37 mL/m2, sclerotic aortic valve, mild TR, mild PI  Stress echo, 5/08/2020:  LVEF 55%. ECG and echo portion negative for ischemia. Low risk stress test.   Hypertension   Hyperlipidemia   Hypothyroidism   GERD   Osteoarthritis   BPH     HPI:    Active without cardiopulmonary complaints.  Denies chest pain, unusual dyspnea.  Blood pressure at home 120s over 70s.    Has some nocturia    Review of Systems:  As noted above in the HPI    PFSH:  Patient Active Problem List   Diagnosis    Essential hypertension    Mixed hyperlipidemia    GERD (gastroesophageal reflux disease)    Hypothyroidism (acquired)    Aortic valve sclerosis    Non-rheumatic aortic regurgitation    Lower urinary tract symptoms (LUTS)         Current Outpatient Medications:     acyclovir (ZOVIRAX) 400 MG tablet, Take 1 tablet by mouth., Disp: , Rfl:     amLODIPine (NORVASC) 5 MG tablet, Take 1 tablet by mouth Daily., Disp: 30 tablet, Rfl: 5    aspirin 81 MG EC tablet, Take 1 tablet by mouth Daily., Disp: , Rfl:     azelastine (OPTIVAR) 0.05 % ophthalmic solution, 1 drop 2 (Two) Times a Day., Disp: , Rfl:     brimonidine-timolol (COMBIGAN) 0.2-0.5 % ophthalmic solution, Every 12 (Twelve) Hours., Disp: , Rfl:     coenzyme Q10 100 MG capsule, Take 1 capsule by mouth Daily., Disp: , Rfl:     hydroCHLOROthiazide  "(MICROZIDE) 12.5 MG capsule, Take 1 capsule by mouth Daily., Disp: 30 capsule, Rfl: 5    HYDROcodone-acetaminophen (NORCO) 5-325 MG per tablet, Take 1 tablet by mouth Every 6 (Six) Hours As Needed for Severe Pain ., Disp: 12 tablet, Rfl: 0    irbesartan (AVAPRO) 300 MG tablet, , Disp: , Rfl:     Krill Oil 1000 MG capsule, Take  by mouth., Disp: , Rfl:     levothyroxine (SYNTHROID, LEVOTHROID) 125 MCG tablet, Take 1 tablet by mouth Daily., Disp: , Rfl:     loteprednol etabonate (LOTEMAX) 0.5 % gel ophthalmic gel, Apply  to eye(s) as directed by provider 4 (Four) Times a Day., Disp: , Rfl:     Multiple Vitamin (MULTI-VITAMIN DAILY PO), Take  by mouth Daily., Disp: , Rfl:     pantoprazole (PROTONIX) 40 MG EC tablet, Take 1 tablet by mouth Daily., Disp: , Rfl:     rosuvastatin (CRESTOR) 10 MG tablet, Take 1 tablet by mouth Daily., Disp: 30 tablet, Rfl: 5    Allergies   Allergen Reactions    Ciprofloxacin Anaphylaxis    Sulfa Antibiotics GI Intolerance       Social History     Socioeconomic History    Marital status:    Tobacco Use    Smoking status: Former     Current packs/day: 0.00     Types: Cigarettes     Quit date: 3/13/1980     Years since quittin.2     Passive exposure: Past    Smokeless tobacco: Never   Vaping Use    Vaping status: Never Used   Substance and Sexual Activity    Alcohol use: Yes     Comment: occas    Drug use: Never    Sexual activity: Defer     Family History   Problem Relation Age of Onset    Stroke Other     Hypertension Other     Hyperlipidemia Other     Hypertension Mother     Stroke Father     Leukemia Sister     No Known Problems Brother     No Known Problems Brother          Objective:   Physical Exam:  /70 (BP Location: Left arm, Patient Position: Sitting)   Pulse 67   Ht 180.3 cm (71\")   Wt 90.3 kg (199 lb)   SpO2 96%   BMI 27.75 kg/m²   CONSTITUTIONAL: No acute distress  CARDIOVASCULAR: Regular rate and rhythm.  YOANNA at RUSB with mild radiation to the " "carotids  PERIPHERAL VASCULAR: Normal radial pulse.     Labs:  Labs reviewed by myself    No results found for: \"CHOL\"  No results found for: \"TRIG\"  No results found for: \"HDL\"  No results found for: \"LDL\"  No components found for: \"LDLDIRECTC\"    PCP labs, 3/04/2025  CMP:  Na 140, K 3.8, BUN 13, creatinine 1.12, ALT 21, eGFR 70      Diagnostic Data:      ECG 12 Lead    Date/Time: 6/9/2025 1:39 PM  Performed by: Garfield Johnson MD    Authorized by: Garfield Johnson MD  Comparison: compared with previous ECG from 2/5/2021  Similar to previous ECG  Comparison to previous ECG: LVH  Rhythm: sinus rhythm          Results for orders placed during the hospital encounter of 05/08/20    Adult Stress Echo W/ Cont or Stress Agent if Necessary Per Protocol    Interpretation Summary  · Left ventricular ejection fraction normal at 55%.  · The exercise ECG portion of the stress test was negative for clinical and ECG evidence of myocardial ischemia.  · The echocardiographic portion of the stress test was negative for evidence of myocardial ischemia.  · Impressions are consistent with a low risk stress test.      Assessment and Plan:     Cardiac murmur  Aortic valve sclerosis  LVH by ECG criteria  -Murmur seems more notable when compared to previous exam.  Some radiation to the carotids.    -Repeat echo    Essential hypertension  -Continue lifestyle modifications.  Has lost 10 pounds recently and noticed some improvement in his blood pressure  - Decreasing amlodipine to 5 mg daily and increasing hydrochlorothiazide to 12.5 mg once a day for more consistent blood pressure medications each day  - Continue irbesartan  - Patient to call in 1 week with an update and blood pressure measurements    Mixed hyperlipidemia   - Decreasing rosuvastatin from 20 mg every other night to 10 mg nightly  - Requesting last set of FLP from PCP office    -Patient wishes to continue longitudinal follow-up    - Return in about 1 year (around 6/9/2026) for " Next follow up with [facility] [place].

## 2025-06-16 ENCOUNTER — TELEPHONE (OUTPATIENT)
Dept: CARDIOLOGY | Facility: CLINIC | Age: 70
End: 2025-06-16
Payer: MEDICARE

## 2025-06-16 NOTE — TELEPHONE ENCOUNTER
Received voicemail from pt stating he was supposed to call this week to give his blood pressure readings.    I attempted to reach pt. No answer. Left voicemail for pt to return call.

## 2025-06-16 NOTE — TELEPHONE ENCOUNTER
Pt returned call. He states that this morning his blood pressure was 124/76. This afternoon, it was 117/67. He states he did not write his numbers down over the last week,but they all averaged about what they were today. He said the morning numbers are between 120-130/70s and afternoon are 115-118/60s. I recommended he start writing the  date, time, blood pressure and heart rate down when he checks so it can be monitored and trends can be seen. He verbalized understanding and said he would start doing that, so he can bring them to his appointments with Dr. Johnson as well.

## 2025-06-17 NOTE — TELEPHONE ENCOUNTER
Attempted to reach pt to inform him what Dr. Johnson said about blood pressures. He did not answer. Left voicemail telling that Dr. Johnson said numbers are good and to continue current plan, and to call with any questions or concerns.

## 2025-08-01 ENCOUNTER — HOSPITAL ENCOUNTER (OUTPATIENT)
Dept: CARDIOLOGY | Facility: HOSPITAL | Age: 70
Discharge: HOME | End: 2025-08-01
Payer: MEDICARE

## 2025-08-01 VITALS
WEIGHT: 198.41 LBS | SYSTOLIC BLOOD PRESSURE: 153 MMHG | DIASTOLIC BLOOD PRESSURE: 78 MMHG | HEIGHT: 71 IN | BODY MASS INDEX: 27.78 KG/M2

## 2025-08-01 DIAGNOSIS — I35.8 AORTIC VALVE SCLEROSIS: ICD-10-CM

## 2025-08-01 DIAGNOSIS — I10 ESSENTIAL HYPERTENSION: ICD-10-CM

## 2025-08-01 DIAGNOSIS — E78.2 MIXED HYPERLIPIDEMIA: ICD-10-CM

## 2025-08-01 LAB
AORTIC DIMENSIONLESS INDEX: 0.74 (DI)
ASCENDING AORTA: 3.6 CM
AV MEAN PRESS GRAD SYS DOP V1V2: 8 MMHG
AV VMAX SYS DOP: 193.3 CM/SEC
BH CV ECHO MEAS - AO MAX PG: 15 MMHG
BH CV ECHO MEAS - AO ROOT DIAM: 3 CM
BH CV ECHO MEAS - AO V2 VTI: 37.5 CM
BH CV ECHO MEAS - AVA(I,D): 2.31 CM2
BH CV ECHO MEAS - EDV(CUBED): 79.5 ML
BH CV ECHO MEAS - EDV(MOD-SP2): 67.9 ML
BH CV ECHO MEAS - EDV(MOD-SP4): 74.4 ML
BH CV ECHO MEAS - EF(MOD-SP2): 62.7 %
BH CV ECHO MEAS - EF(MOD-SP4): 63.3 %
BH CV ECHO MEAS - ESV(CUBED): 24.4 ML
BH CV ECHO MEAS - ESV(MOD-SP2): 25.3 ML
BH CV ECHO MEAS - ESV(MOD-SP4): 27.3 ML
BH CV ECHO MEAS - FS: 32.6 %
BH CV ECHO MEAS - IVS/LVPW: 1 CM
BH CV ECHO MEAS - IVSD: 1 CM
BH CV ECHO MEAS - LA DIMENSION: 3.2 CM
BH CV ECHO MEAS - LAT PEAK E' VEL: 9.8 CM/SEC
BH CV ECHO MEAS - LV DIASTOLIC VOL/BSA (35-75): 39.5 CM2
BH CV ECHO MEAS - LV MASS(C)D: 142.5 GRAMS
BH CV ECHO MEAS - LV MAX PG: 7 MMHG
BH CV ECHO MEAS - LV MEAN PG: 4 MMHG
BH CV ECHO MEAS - LV SYSTOLIC VOL/BSA (12-30): 14.5 CM2
BH CV ECHO MEAS - LV V1 MAX: 132.5 CM/SEC
BH CV ECHO MEAS - LV V1 VTI: 27.6 CM
BH CV ECHO MEAS - LVIDD: 4.3 CM
BH CV ECHO MEAS - LVIDS: 2.9 CM
BH CV ECHO MEAS - LVOT AREA: 3.1 CM2
BH CV ECHO MEAS - LVOT DIAM: 2 CM
BH CV ECHO MEAS - LVPWD: 1 CM
BH CV ECHO MEAS - MED PEAK E' VEL: 7.6 CM/SEC
BH CV ECHO MEAS - MV A MAX VEL: 108 CM/SEC
BH CV ECHO MEAS - MV DEC SLOPE: 364 CM/SEC2
BH CV ECHO MEAS - MV DEC TIME: 0.27 SEC
BH CV ECHO MEAS - MV E MAX VEL: 90.1 CM/SEC
BH CV ECHO MEAS - MV E/A: 0.83
BH CV ECHO MEAS - MV MAX PG: 5 MMHG
BH CV ECHO MEAS - MV MEAN PG: 2 MMHG
BH CV ECHO MEAS - MV P1/2T: 83.7 MSEC
BH CV ECHO MEAS - MV V2 VTI: 36.2 CM
BH CV ECHO MEAS - MVA(P1/2T): 2.6 CM2
BH CV ECHO MEAS - MVA(VTI): 2.4 CM2
BH CV ECHO MEAS - PA ACC TIME: 0.15 SEC
BH CV ECHO MEAS - RAP SYSTOLE: 3 MMHG
BH CV ECHO MEAS - RVSP: 30 MMHG
BH CV ECHO MEAS - SV(LVOT): 86.7 ML
BH CV ECHO MEAS - SV(MOD-SP2): 42.6 ML
BH CV ECHO MEAS - SV(MOD-SP4): 47.1 ML
BH CV ECHO MEAS - SVI(LVOT): 46 ML/M2
BH CV ECHO MEAS - SVI(MOD-SP2): 22.6 ML/M2
BH CV ECHO MEAS - SVI(MOD-SP4): 25 ML/M2
BH CV ECHO MEAS - TAPSE (>1.6): 1.91 CM
BH CV ECHO MEAS - TR MAX PG: 26.7 MMHG
BH CV ECHO MEAS - TR MAX VEL: 258.3 CM/SEC
BH CV ECHO MEASUREMENTS AVERAGE E/E' RATIO: 10.36
BH CV VAS BP RIGHT ARM: NORMAL MMHG
BH CV XLRA - RV BASE: 2.6 CM
BH CV XLRA - RV LENGTH: 5.5 CM
BH CV XLRA - RV MID: 2.5 CM
BH CV XLRA - TDI S': 12.4 CM/SEC
IVRT: 102 MS
LEFT ATRIUM VOLUME INDEX: 29.8 ML/M2
LV EF BIPLANE MOD: 61.4 %

## 2025-08-01 PROCEDURE — 93306 TTE W/DOPPLER COMPLETE: CPT

## 2025-08-05 ENCOUNTER — RESULTS FOLLOW-UP (OUTPATIENT)
Dept: CARDIOLOGY | Facility: CLINIC | Age: 70
End: 2025-08-05
Payer: MEDICARE